# Patient Record
Sex: MALE | Race: WHITE | Employment: OTHER | URBAN - METROPOLITAN AREA
[De-identification: names, ages, dates, MRNs, and addresses within clinical notes are randomized per-mention and may not be internally consistent; named-entity substitution may affect disease eponyms.]

---

## 2017-12-07 ENCOUNTER — HOSPITAL ENCOUNTER (OUTPATIENT)
Dept: NEUROLOGY | Age: 67
Discharge: HOME | End: 2017-12-07
Attending: INTERNAL MEDICINE | Admitting: INTERNAL MEDICINE

## 2017-12-07 DIAGNOSIS — E11.9 TYPE 2 DIABETES MELLITUS WITHOUT COMPLICATIONS (HCC): ICD-10-CM

## 2021-11-21 ENCOUNTER — APPOINTMENT (OUTPATIENT)
Dept: CT IMAGING | Age: 71
End: 2021-11-21
Payer: MEDICARE

## 2021-11-21 ENCOUNTER — APPOINTMENT (OUTPATIENT)
Dept: GENERAL RADIOLOGY | Age: 71
End: 2021-11-21
Payer: MEDICARE

## 2021-11-21 ENCOUNTER — HOSPITAL ENCOUNTER (EMERGENCY)
Age: 71
Discharge: HOME | End: 2021-11-21
Attending: EMERGENCY MEDICINE
Payer: MEDICARE

## 2021-11-21 VITALS
DIASTOLIC BLOOD PRESSURE: 76 MMHG | HEART RATE: 90 BPM | SYSTOLIC BLOOD PRESSURE: 170 MMHG | TEMPERATURE: 99.5 F | RESPIRATION RATE: 16 BRPM | OXYGEN SATURATION: 96 %

## 2021-11-21 DIAGNOSIS — R55 SYNCOPE AND COLLAPSE: Primary | ICD-10-CM

## 2021-11-21 DIAGNOSIS — R51.9 HEADACHE, UNSPECIFIED HEADACHE TYPE: ICD-10-CM

## 2021-11-21 LAB
ANION GAP SERPL CALCULATED.3IONS-SCNC: 10 MMOL/L (ref 3–16)
BASOPHILS ABSOLUTE: 0 K/UL (ref 0–0.2)
BASOPHILS RELATIVE PERCENT: 0.2 %
BUN BLDV-MCNC: 17 MG/DL (ref 7–20)
CALCIUM SERPL-MCNC: 9.3 MG/DL (ref 8.3–10.6)
CHLORIDE BLD-SCNC: 102 MMOL/L (ref 99–110)
CO2: 24 MMOL/L (ref 21–32)
CREAT SERPL-MCNC: 1 MG/DL (ref 0.8–1.3)
D DIMER: 301 NG/ML DDU (ref 0–229)
EKG ATRIAL RATE: 91 BPM
EKG DIAGNOSIS: NORMAL
EKG P AXIS: 39 DEGREES
EKG P-R INTERVAL: 158 MS
EKG Q-T INTERVAL: 350 MS
EKG QRS DURATION: 112 MS
EKG QTC CALCULATION (BAZETT): 430 MS
EKG R AXIS: 23 DEGREES
EKG T AXIS: 36 DEGREES
EKG VENTRICULAR RATE: 91 BPM
EOSINOPHILS ABSOLUTE: 0 K/UL (ref 0–0.6)
EOSINOPHILS RELATIVE PERCENT: 0.1 %
GFR AFRICAN AMERICAN: >60
GFR NON-AFRICAN AMERICAN: >60
GLUCOSE BLD-MCNC: 155 MG/DL (ref 70–99)
HCT VFR BLD CALC: 40.5 % (ref 40.5–52.5)
HEMOGLOBIN: 13.6 G/DL (ref 13.5–17.5)
LYMPHOCYTES ABSOLUTE: 0.4 K/UL (ref 1–5.1)
LYMPHOCYTES RELATIVE PERCENT: 8.3 %
MCH RBC QN AUTO: 29.8 PG (ref 26–34)
MCHC RBC AUTO-ENTMCNC: 33.7 G/DL (ref 31–36)
MCV RBC AUTO: 88.4 FL (ref 80–100)
MONOCYTES ABSOLUTE: 0.6 K/UL (ref 0–1.3)
MONOCYTES RELATIVE PERCENT: 11.7 %
NEUTROPHILS ABSOLUTE: 3.9 K/UL (ref 1.7–7.7)
NEUTROPHILS RELATIVE PERCENT: 79.7 %
PDW BLD-RTO: 12.5 % (ref 12.4–15.4)
PLATELET # BLD: 146 K/UL (ref 135–450)
PMV BLD AUTO: 9.1 FL (ref 5–10.5)
POTASSIUM REFLEX MAGNESIUM: 4.6 MMOL/L (ref 3.5–5.1)
RBC # BLD: 4.59 M/UL (ref 4.2–5.9)
SODIUM BLD-SCNC: 136 MMOL/L (ref 136–145)
TROPONIN: <0.01 NG/ML
WBC # BLD: 4.8 K/UL (ref 4–11)

## 2021-11-21 PROCEDURE — 85379 FIBRIN DEGRADATION QUANT: CPT

## 2021-11-21 PROCEDURE — 70450 CT HEAD/BRAIN W/O DYE: CPT

## 2021-11-21 PROCEDURE — 80048 BASIC METABOLIC PNL TOTAL CA: CPT

## 2021-11-21 PROCEDURE — 93010 ELECTROCARDIOGRAM REPORT: CPT | Performed by: INTERNAL MEDICINE

## 2021-11-21 PROCEDURE — 84484 ASSAY OF TROPONIN QUANT: CPT

## 2021-11-21 PROCEDURE — 71045 X-RAY EXAM CHEST 1 VIEW: CPT

## 2021-11-21 PROCEDURE — 99283 EMERGENCY DEPT VISIT LOW MDM: CPT

## 2021-11-21 PROCEDURE — 93005 ELECTROCARDIOGRAM TRACING: CPT | Performed by: EMERGENCY MEDICINE

## 2021-11-21 PROCEDURE — 36415 COLL VENOUS BLD VENIPUNCTURE: CPT

## 2021-11-21 PROCEDURE — 85025 COMPLETE CBC W/AUTO DIFF WBC: CPT

## 2021-11-21 NOTE — ED PROVIDER NOTES
eMERGENCY dEPARTMENT eNCOUnter      [facility]    Chief Complaint   Patient presents with    Altered Mental Status     PT states he had LOC for about 20 min or so about 0700 this AM.  PT glucose at 0720 was 142, EMS called, glucose 148. PT wife transported PT to hospital.  Had simular episode in 2017       HPI    Zuleima Mallory is a 65 y.o. male who presents the ER for evaluation of a syncopal episode he had a preceding headache preceding his syncope, he had no prodrome with this. He was apparently stable his glucose was 124 and had complete loss of consciousness with no prodrome. No seizure activity no loss of bowel or bladder function. He has similar episode 5 years of unclear etiology. He is a GCS of 15 at this point in time. No fevers no rigors. No loss of bowel or bladder function. No prodrome. PAST MEDICAL HISTORY    Past Medical History:   Diagnosis Date    Diabetes mellitus ([facility] [address]     Hyperlipidemia        SURGICAL HISTORY    History reviewed. No pertinent surgical history. CURRENT MEDICATIONS    Current Outpatient Rx   Medication Sig Dispense Refill    METFORMIN HCL PO Take 750 mg by mouth 2 times daily      simvastatin (ZOCOR) 20 MG tablet Take 20 mg by mouth nightly         ALLERGIES    No Known Allergies    FAMILY HISTORY    History reviewed. No pertinent family history.     SOCIAL HISTORY    Social History     Socioeconomic History    Marital status:      Spouse name: None    Number of children: None    Years of education: None    Highest education level: None   Occupational History    None   Tobacco Use    Smoking status: Former Smoker    Smokeless tobacco: Never Used   Substance and Sexual Activity    Alcohol use: None    Drug use: None    Sexual activity: None   Other Topics Concern    None   Social History Narrative    None     Social Determinants of Health     Financial Resource Strain:     Difficulty of Paying Living Expenses: Not on file   Food Insecurity:  Worried About Running Out of Food in the Last Year: Not on file    Andrew of Food in the Last Year: Not on file   Transportation Needs:     Lack of Transportation (Medical): Not on file    Lack of Transportation (Non-Medical): Not on file   Physical Activity:     Days of Exercise per Week: Not on file    Minutes of Exercise per Session: Not on file   Stress:     Feeling of Stress : Not on file   Social Connections:     Frequency of Communication with Friends and Family: Not on file    Frequency of Social Gatherings with Friends and Family: Not on file    Attends Cheondoism Services: Not on file    Active Member of [address] [place] [address] or Organizations: Not on file    Attends Club or Organization Meetings: Not on file    Marital Status: Not on file   Intimate Partner Violence:     Fear of Current or Ex-Partner: Not on file    Emotionally Abused: Not on file    Physically Abused: Not on file    Sexually Abused: Not on file   Housing Stability:     Unable to Pay for Housing in the Last Year: Not on file    Number of [place] in the Last Year: Not on file    Unstable Housing in the Last Year: Not on file       REVIEW OF SYSTEMS    Constitutional:  Denies fever, chills, weight loss or weakness   Eyes:  Denies photophobia or discharge   HENT:  Denies sore throat or ear pain   Respiratory:  Denies cough or shortness of breath   Cardiovascular:  Denies chest pain, palpitations or swelling   GI:  Denies abdominal pain, nausea, vomiting, or diarrhea   Musculoskeletal:  Denies back pain   Skin:  Denies rash   Neurologic:  + headache, focal weakness or sensory changes   Endocrine:  Denies polyuria or polydypsia   Lymphatic:  Denies swollen glands   Psychiatric:  Denies depression, suicidal ideation or homicidal ideation   All systems negative except as marked.      PHYSICAL EXAM    VITAL SIGNS: BP (!) 170/76   Pulse 90   Temp 99.5 °F (37.5 °C)   Resp 16   SpO2 96%    Constitutional:  Well developed, Well nourished, No acute distress, Non-toxic appearance. HENT:  Normocephalic, Atraumatic, Bilateral external ears normal, Oropharynx moist, No oral exudates, Nose normal. Neck- Normal range of motion, No tenderness, Supple, No stridor. Eyes:  PERRL, EOMI, Conjunctiva normal, No discharge. Respiratory:  Normal breath sounds, No respiratory distress, No wheezing, No chest tenderness. Cardiovascular:  Normal heart rate, Normal rhythm, No murmurs, No rubs, No gallops. GI:  Bowel sounds normal, Soft, No tenderness, No masses, No pulsatile masses. Musculoskeletal:  Intact distal pulses, No edema, No tenderness, No cyanosis, No clubbing. Good range of motion in all major joints. No tenderness to palpation or major deformities noted. Back- No tenderness. Integument:  Warm, Dry, No erythema, No rash. Lymphatic:  No lymphadenopathy noted. Neurologic:  Alert & oriented x 3, Normal motor function, Normal sensory function, No focal deficits noted. Psychiatric:  Affect normal, Judgment normal, Mood normal.     EKG    Sinus rhythm, normal axis, right bundle branch block normal R wave progression no acute ST segment abnormalities    RADIOLOGY    Ct head: no ich/no cva    PROCEDURES    None    ED COURSE & MEDICAL DECISION MAKING    Pertinent Labs & Imaging studies reviewed. (See chart for details)  Patient presents to the ER for evaluation of headache resolved with associated syncope. He has no evidence of subarachnoid hemorrhage, has had similar episode in the past with vasovagal syncope. His [place] syncope score is unremarkable. He has had no dysrhythmias, no troponin leak no evidence of intracranial hemorrhage. Is a GCS of 15 with no focal neurologic deficits. He had resolved headache with no active headache currently. Outpatient follow-up with treating physician and cardiology return if worse or new symptoms    FINAL IMPRESSION    1. Syncope and collapse    2.  Headache, unspecified headache type Renato Ha MD  07/95/13 9319

## 2021-11-21 NOTE — ED NOTES
Bed: 23  Expected date:   Expected time:   Means of arrival:   Comments:  Angel Lara RN  94/40/37 [id number]

## 2021-11-22 NOTE — PROGRESS NOTES
Date Taking? Authorizing Provider   aspirin 81 MG EC tablet Take 81 mg by mouth daily   Yes Historical Provider, MD   calcium carbonate (OYSTER SHELL CALCIUM 500 MG) 1250 (500 Ca) MG tablet Take 1,200 mg by mouth daily   Yes Historical Provider, MD   Coenzyme Q10 50 MG CAPS Take 100 mg by mouth daily   Yes Historical Provider, MD   Cinnamon 500 MG CAPS Take by mouth daily   Yes Historical Provider, MD   cyanocobalamin 1000 MCG tablet Take 1,000 mcg by mouth daily   Yes Historical Provider, MD   Magnesium 500 MG CAPS Take by mouth   Yes Historical Provider, MD   Multiple Vitamins-Minerals (PRESERVISION AREDS 2 PO) Take by mouth   Yes Historical Provider, MD   METFORMIN HCL PO Take 750 mg by mouth 2 times daily   Yes Historical Provider, MD   simvastatin (ZOCOR) 20 MG tablet Take 20 mg by mouth nightly   Yes Historical Provider, MD        Allergies:  Patient has no known allergies. Family history has been reviewed and not pertinent except as noted above. Review of Systems:   · Constitutional: there has been no unanticipated weight loss. No change in energy or activity level   · Eyes: No visual changes   · ENT: No Headaches, hearing loss or vertigo. No mouth sores or sore throat. · Cardiovascular: Reviewed in HPI  · Respiratory: No cough or wheezing, no sputum production. · Gastrointestinal: No abdominal pain, appetite loss, blood in stools. No change in bowel or bladder habits. · Genitourinary: No nocturia, dysuria, trouble voiding  · Musculoskeletal:  No gait disturbance, weakness or joint complaints. · Integumentary: No rash or pruritis. · Neurological: No headache, change in muscle strength, numbness or tingling. No postictal state with his syncope epsodes. No change in gait, balance, coordination, mood, affect, memory, mentation, behavior.   · Psychiatric: No anxiety or depression  · Endocrine: No malaise or fever  · Hematologic/Lymphatic: No abnormal bruising or bleeding, blood clots or swollen lymph nodes. · Allergic/Immunologic: No nasal congestion or hives. Physical Examination:    Vitals:    12/08/21 1524   BP: (!) 146/78   Pulse: 102   SpO2: 96%        Wt Readings from Last 1 Encounters:   12/08/21 167 lb (75.8 kg)        · CONSTITUTIONAL: Well developed, well nourished  · EYES: PERRLA. No xanthelasma, sclera non icteric  · EARS,NOSE,MOUTH,THROAT:  Mucous membranes moist, normal hearing  · NECK: Supple, JVP normal, thyroid not enlarged. Carotids 2+ without bruits  · RESPIRATORY: Normal effort, no rales or rhonchi  · CARDIOVASCULAR: Normal PMI, regular rate and rhythm, no murmurs, rub or gallop. No edema. Radial pulses present and equal  · CHEST: No scar or masses  · ABDOMEN: Normal bowel sounds. No masses or tenderness. No bruit  · MUSCULOSKELETAL: No clubbing or cyanosis. Moves all extremities well. Normal gait  · SKIN:  Warm and dry. No rashes  · NEUROLOGIC: Cranial nerves intact. Alert and oriented  · PSYCHIATRIC: Calm affect. Appears to have normal judgement and insight     All testing and labs listed below were personally reviewed by myself. Assessment:    Syncope, recent  -Lack of prodrome is very concerning for cardiogenic syncope  -DDx includes absence seizure, but those are rare outside of childhood  -Seen in ER 11/21/21, preceded by headache. No prodromal symptoms, was sitting in a chair at the time while wife cut a hangnail. -EKG 11/21/21 (in the [facility] ER)> NSR 91, IRBBB  -One other episode in 12/2017.  -Holter monitor 12/2017> NSR/SA, ahr 79 (ranges ), PSVT, 7 PVC's  -Carotid dopplers 2018> Less than 50% bilaterally  -Plain GXT 2018> Interpetation Summary:    1. Negative ECG for ischemia with graded exercise test.    2. Duke Treadmill Score is 10 which indicates low risk.      Plan: MCOT x 30 days, echocardiogram to r/o aortic stenosis or LV dysfunction, carotid Dopplers    Hyperlipidemia   -Taking simvastatin 20mg  -Managed per PCP    Diabetes mellitus  -Managed by PCP        PLAN:  1. MCOT x 30 days. Consider EP evaluation with possible ILR implantation. 2. ECHO soon  3. Carotid dopplers soon  4. Follow up in 6 weeks    I appreciate the opportunity of cooperating in the care of this individual.    Quan West M.D. Tonoibe's attestation: This note was scribed in the presence of Dr. Kwasi Saldaña MD, by Edelmira Seo RN. Physician Attestation  The scribe for and in the presence of me Quan West MD). The scribe may have prepopulated components of this note with my historical  intellectual property under my direct supervision. Any additions to this intellectual property were performed in my presence and at my direction. Furthermore, the content and accuracy of this note have been reviewed by me. Cassidy Gamez MD 12/8/2021 4:29 PM

## 2021-12-08 ENCOUNTER — OFFICE VISIT (OUTPATIENT)
Dept: CARDIOLOGY CLINIC | Age: 71
End: 2021-12-08
Payer: MEDICARE

## 2021-12-08 VITALS
OXYGEN SATURATION: 96 % | DIASTOLIC BLOOD PRESSURE: 78 MMHG | BODY MASS INDEX: 24.73 KG/M2 | HEIGHT: 69 IN | HEART RATE: 102 BPM | WEIGHT: 167 LBS | SYSTOLIC BLOOD PRESSURE: 146 MMHG

## 2021-12-08 DIAGNOSIS — R55 SYNCOPE, UNSPECIFIED SYNCOPE TYPE: Primary | ICD-10-CM

## 2021-12-08 DIAGNOSIS — I65.23 BILATERAL CAROTID ARTERY STENOSIS: ICD-10-CM

## 2021-12-08 DIAGNOSIS — E78.49 OTHER HYPERLIPIDEMIA: ICD-10-CM

## 2021-12-08 PROCEDURE — 99204 OFFICE O/P NEW MOD 45 MIN: CPT | Performed by: INTERNAL MEDICINE

## 2021-12-08 PROCEDURE — G8420 CALC BMI NORM PARAMETERS: HCPCS | Performed by: INTERNAL MEDICINE

## 2021-12-08 PROCEDURE — 3017F COLORECTAL CA SCREEN DOC REV: CPT | Performed by: INTERNAL MEDICINE

## 2021-12-08 PROCEDURE — 93228 REMOTE 30 DAY ECG REV/REPORT: CPT | Performed by: INTERNAL MEDICINE

## 2021-12-08 PROCEDURE — G8427 DOCREV CUR MEDS BY ELIG CLIN: HCPCS | Performed by: INTERNAL MEDICINE

## 2021-12-08 PROCEDURE — G8484 FLU IMMUNIZE NO ADMIN: HCPCS | Performed by: INTERNAL MEDICINE

## 2021-12-08 PROCEDURE — 1036F TOBACCO NON-USER: CPT | Performed by: INTERNAL MEDICINE

## 2021-12-08 PROCEDURE — 4040F PNEUMOC VAC/ADMIN/RCVD: CPT | Performed by: INTERNAL MEDICINE

## 2021-12-08 PROCEDURE — 1123F ACP DISCUSS/DSCN MKR DOCD: CPT | Performed by: INTERNAL MEDICINE

## 2021-12-08 RX ORDER — AMPICILLIN TRIHYDRATE 250 MG
CAPSULE ORAL DAILY
COMMUNITY

## 2021-12-08 RX ORDER — ASPIRIN 81 MG/1
81 TABLET ORAL DAILY
COMMUNITY

## 2021-12-08 RX ORDER — IBUPROFEN 200 MG
1200 CAPSULE ORAL DAILY
COMMUNITY

## 2021-12-08 RX ORDER — UBIDECARENONE 50 MG
100 CAPSULE ORAL DAILY
COMMUNITY

## 2021-12-08 RX ORDER — FOLIC ACID 0.8 MG
TABLET ORAL
COMMUNITY

## 2021-12-10 ENCOUNTER — HOSPITAL ENCOUNTER (OUTPATIENT)
Dept: CARDIOLOGY | Age: 71
Discharge: HOME | End: 2021-12-10
Payer: MEDICARE

## 2021-12-10 DIAGNOSIS — R55 SYNCOPE, UNSPECIFIED SYNCOPE TYPE: ICD-10-CM

## 2021-12-10 DIAGNOSIS — I65.23 BILATERAL CAROTID ARTERY STENOSIS: ICD-10-CM

## 2021-12-10 DIAGNOSIS — E78.49 OTHER HYPERLIPIDEMIA: ICD-10-CM

## 2021-12-10 LAB
LV EF: 63 %
LVEF MODALITY: NORMAL

## 2021-12-10 PROCEDURE — 93880 EXTRACRANIAL BILAT STUDY: CPT

## 2021-12-10 PROCEDURE — 93306 TTE W/DOPPLER COMPLETE: CPT

## 2022-01-10 NOTE — PROGRESS NOTES
[address]   Cardiac Consultation    Referring Provider:  Sánchez Horowitz MD     Chief Complaint   Patient presents with    Follow-up    Loss of Consciousness      History of Present Illness:  Mr Zan Galdamez is seen today in follow up to go over results of studies we ordered at last visit to investigate a possible cardiac syncopal episode. He had a single syncopal episode on 11/21/21 as detailed in prior notes. He had a remote syncopal episode 4 years ago. He feels great today without any recurrent syncope or chest pain. Today Pt denies any lightheadedness or dizziness. Denies fainting since his last visit. No chest pain, SOB or swelling. Past Medical History:   has a past medical history of Diabetes mellitus ([address] and Hyperlipidemia. Surgical History:   has a past surgical history that includes knee surgery (Right, 1999); Inguinal hernia repair (1959); and Cataract removal with implant (Left, 2020). Social History:   reports that he has quit smoking. He has never used smokeless tobacco. He reports current alcohol use of about 1.0 standard drink of alcohol per week. Family History:  family history includes Hypertension in his father and mother. Home Medications:  Prior to Admission medications    Medication Sig Start Date End Date Taking?  Authorizing Provider   aspirin 81 MG EC tablet Take 81 mg by mouth daily   Yes Historical Provider, MD   calcium carbonate (OYSTER SHELL CALCIUM 500 MG) 1250 (500 Ca) MG tablet Take 1,200 mg by mouth daily   Yes Historical Provider, MD   Coenzyme Q10 50 MG CAPS Take 100 mg by mouth daily   Yes Historical Provider, MD   Cinnamon 500 MG CAPS Take by mouth daily   Yes Historical Provider, MD   cyanocobalamin 1000 MCG tablet Take 1,000 mcg by mouth daily   Yes Historical Provider, MD   Magnesium 500 MG CAPS Take by mouth   Yes Historical Provider, MD   Multiple Vitamins-Minerals (PRESERVISION AREDS 2 PO) Take by mouth   Yes Historical Provider, MD METFORMIN HCL PO Take 500 mg by mouth 3 times daily    Yes Historical Provider, MD   simvastatin (ZOCOR) 20 MG tablet Take 20 mg by mouth nightly   Yes Historical Provider, MD        Allergies:  Patient has no known allergies. Family history has been reviewed and not pertinent except as noted above. Physical Examination:    Vitals:    01/14/22 1411   BP: (!) 146/82   Pulse: 84   SpO2: 98%        Wt Readings from Last 1 Encounters:   01/14/22 172 lb 6.4 oz (78.2 kg)        · CONSTITUTIONAL: Well developed, well nourished  · EYES: PERRLA. No xanthelasma, sclera non icteric  · EARS,NOSE,MOUTH,THROAT:  Mucous membranes moist, normal hearing  · NECK: Supple, JVP normal, thyroid not enlarged. Carotids 2+ without bruits  · RESPIRATORY: Normal effort, no rales or rhonchi  · CARDIOVASCULAR: Normal PMI, regular rate and rhythm, no murmurs, rub or gallop. No edema. Radial pulses present and equal  · CHEST: No scar or masses  · ABDOMEN: Normal bowel sounds. No masses or tenderness. No bruit  · MUSCULOSKELETAL: No clubbing or cyanosis. Moves all extremities well. Normal gait  · SKIN:  Warm and dry. No rashes  · NEUROLOGIC: Cranial nerves intact. Alert and oriented  · PSYCHIATRIC: Calm affect. Appears to have normal judgement and insight   All testing and labs listed below were personally reviewed by myself    Echo: 12/10/2021   . Left Ventricle  Normal left ventricle size, wall thickness and systolic function with an estimated ejection fraction of 60-65%. No regional wall motion  abnormalities are seen. Normal diastolic function. E/e\"=10.45. Mitral Valve  The mitral valve normal in structure and function. No evidence of mitral regurgitation or stenosis. Mitral annular calcification is present. Carotid Duplex Study: 12/10/2021  Right Impression//  The right internal carotid artery appears to have a <50% diameter reducing stenosis based on velocity criteria.   The right distal common carotid artery demonstrates mild focal plaque formation. The right vertebral artery demonstrates normal antegrade flow. The right subclavian artery is visualized and demonstrates biphasic flow. Left Impression  The left internal carotid artery appears to have a <50% diameter reducing stenosis based on velocity criteria. The left distal common carotid artery demonstrates mild focal plaque formation. The left vertebral artery demonstrates normal antegrade flow. The left subclavian artery is visualized and demonstrates biphasic flow. Holter Monitor 30 Days: 12/8/2021   NSR with PVC's & PAC's: Pt monitored 28 days/ 10 hours/ 37 minutes   HR- Highest 144/ Lowest 57/ Average 84          Assessment: 1. Syncope, recent  -Lack of prodrome is very concerning for cardiogenic syncope  -However all studies (echo, cartoid Doppler, and event monitor) do not show significant disease  -He has mild carotid plaque  -Reassured him no further workup needed unless new symptoms    2. Hyperlipidemia   -Taking simvastatin 20mg  -Managed per PCP    3. Diabetes mellitus  -Managed by PCP    Follow up: 1 year    I appreciate the opportunity of cooperating in the care of this individual.    Martir Sanches M.D. Tonoibe's Attestation: This note was scribed in the presence of Dr. Jeffrey Bridges MD by Roz Casper RN. 01/14/22    Physician Attestation  The scribe wrote this note in the presence of me Martir Sanches MD). The scribe may have prepopulated components of this note with my historical  intellectual property under my direct supervision. Any additions to this intellectual property were performed in my presence and at my direction. Furthermore, the content and accuracy of this note have been reviewed by me with edits by me as needed.   Martir Sanches MD 1/14/2022 2:35 PM

## 2022-01-10 NOTE — PROGRESS NOTES
[facility]   Cardiac Consultation    Referring Provider:  Marty Anand MD     No chief complaint on file. History of Present Illness:  Adolfo Almonte is seen today at the request of ER physician for cardiac evaluation for sycope. He was seen in the [facility] ER 11/21/21 with a syncopal episode following a headache. Glucose was 124. No apparent seizure activity, no loss of bowel/bladder. Testing negative, headache resolved, and he was discharged home. He had reported one other episode 12/2017 with unknown etiology (he did trhow up with this episode); HM at the time was negative for concerning arrhythmias. He did not see a cardiologist at the time. His other medical history includes hyperlipidemia and Diabetes. Former smoker. No family history of early heart disease. Today, he reports that on 11/21/21, his wife was assisting him with cutting a hangnail while sitting when he suddenly became unresponsive; she held him in the chair. He was \"staring off into space. \" By the time she got 911 on the phone, he was coming around. He had no prodromal symptoms; they had just finished eating dinner. He did not throw up this time. No symptoms since then. He denies exertional chest pain, BUSH/PND, palpitations, light-headedness, edema. His wife is with him for the visit. Past Medical History:   has a past medical history of Diabetes mellitus ([address] and Hyperlipidemia. Surgical History:   has a past surgical history that includes knee surgery (Right, 1999); Inguinal hernia repair (1959); and Cataract removal with implant (Left, 2020). Social History:   reports that he has quit smoking. He has never used smokeless tobacco. He reports current alcohol use of about 1.0 standard drink of alcohol per week. Family History:  family history includes Hypertension in his father and mother. Home Medications:  Prior to Admission medications    Medication Sig Start Date End Date Taking?  Authorizing Provider   aspirin 81 MG EC tablet Take 81 mg by mouth daily    Historical Provider, MD   calcium carbonate (OYSTER SHELL CALCIUM 500 MG) 1250 (500 Ca) MG tablet Take 1,200 mg by mouth daily    Historical Provider, MD   Coenzyme Q10 50 MG CAPS Take 100 mg by mouth daily    Historical Provider, MD   Cinnamon 500 MG CAPS Take by mouth daily    Historical Provider, MD   cyanocobalamin 1000 MCG tablet Take 1,000 mcg by mouth daily    Historical Provider, MD   Magnesium 500 MG CAPS Take by mouth    Historical Provider, MD   Multiple Vitamins-Minerals (PRESERVISION AREDS 2 PO) Take by mouth    Historical Provider, MD   METFORMIN HCL PO Take 750 mg by mouth 2 times daily    Historical Provider, MD   simvastatin (ZOCOR) 20 MG tablet Take 20 mg by mouth nightly    Historical Provider, MD        Allergies:  Patient has no known allergies. Family history has been reviewed and not pertinent except as noted above. Review of Systems:   · Constitutional: there has been no unanticipated weight loss. No change in energy or activity level   · Eyes: No visual changes   · ENT: No Headaches, hearing loss or vertigo. No mouth sores or sore throat. · Cardiovascular: Reviewed in HPI  · Respiratory: No cough or wheezing, no sputum production. · Gastrointestinal: No abdominal pain, appetite loss, blood in stools. No change in bowel or bladder habits. · Genitourinary: No nocturia, dysuria, trouble voiding  · Musculoskeletal:  No gait disturbance, weakness or joint complaints. · Integumentary: No rash or pruritis. · Neurological: No headache, change in muscle strength, numbness or tingling. No postictal state with his syncope epsodes. No change in gait, balance, coordination, mood, affect, memory, mentation, behavior. · Psychiatric: No anxiety or depression  · Endocrine: No malaise or fever  · Hematologic/Lymphatic: No abnormal bruising or bleeding, blood clots or swollen lymph nodes.   · Allergic/Immunologic: No nasal congestion or hives.    Physical Examination:    There were no vitals filed for this visit. Wt Readings from Last 1 Encounters:   12/08/21 167 lb (75.8 kg)        · CONSTITUTIONAL: Well developed, well nourished  · EYES: PERRLA. No xanthelasma, sclera non icteric  · EARS,NOSE,MOUTH,THROAT:  Mucous membranes moist, normal hearing  · NECK: Supple, JVP normal, thyroid not enlarged. Carotids 2+ without bruits  · RESPIRATORY: Normal effort, no rales or rhonchi  · CARDIOVASCULAR: Normal PMI, regular rate and rhythm, no murmurs, rub or gallop. No edema. Radial pulses present and equal  · CHEST: No scar or masses  · ABDOMEN: Normal bowel sounds. No masses or tenderness. No bruit  · MUSCULOSKELETAL: No clubbing or cyanosis. Moves all extremities well. Normal gait  · SKIN:  Warm and dry. No rashes  · NEUROLOGIC: Cranial nerves intact. Alert and oriented  · PSYCHIATRIC: Calm affect. Appears to have normal judgement and insight   All testing and labs listed below were personally reviewed by myself    Echo: 12/10/2021   . Left Ventricle  Normal left ventricle size, wall thickness and systolic function with an estimated ejection fraction of 60-65%. No regional wall motion  abnormalities are seen. Normal diastolic function. E/e\"=10.45. Mitral Valve  The mitral valve normal in structure and function. No evidence of mitral regurgitation or stenosis. Mitral annular calcification is present. Carotid Duplex Study: 12/10/2021  Right Impression//  The right internal carotid artery appears to have a <50% diameter reducing stenosis based on velocity criteria. The right distal common carotid artery demonstrates mild focal plaque formation. The right vertebral artery demonstrates normal antegrade flow. The right subclavian artery is visualized and demonstrates biphasic flow. Left Impression  The left internal carotid artery appears to have a <50% diameter reducing stenosis based on velocity criteria.   The left distal common carotid artery demonstrates mild focal plaque formation. The left vertebral artery demonstrates normal antegrade flow. The left subclavian artery is visualized and demonstrates biphasic flow. Holter Monitor 30 Days: 12/8/2021           Assessment:    Syncope, recent  -Lack of prodrome is very concerning for cardiogenic syncope  -DDx includes absence seizure, but those are rare outside of childhood  -Seen in ER 11/21/21, preceded by headache. No prodromal symptoms, was sitting in a chair at the time while wife cut a hangnail. -EKG 11/21/21 (in the [facility] ER)> NSR 91, IRBBB  -One other episode in 12/2017.  -Holter monitor 12/2017> NSR/SA, ahr 79 (ranges ), PSVT, 7 PVC's  -Carotid dopplers 2018> Less than 50% bilaterally  -Plain GXT 2018> Interpetation Summary:    1. Negative ECG for ischemia with graded exercise test.    2. Duke Treadmill Score is 10 which indicates low risk. Hyperlipidemia   -Taking simvastatin 20mg  -Managed per PCP    Diabetes mellitus  -Managed by PCP        PLAN:  1.   2.  3.   4.     I appreciate the opportunity of cooperating in the care of this individual.    Jazmine Parada M.D. Scribe's attestation: This note was scribed in the presence of Dr. Joyce Adler MD, by Ofe Allen RN. Physician Attestation  The scribe for and in the presence of me (Josh Castillo RN). The scribe may have prepopulated components of this note with my historical  intellectual property under my direct supervision. Any additions to this intellectual property were performed in my presence and at my direction. Furthermore, the content and accuracy of this note have been reviewed by me. Gee Settler   Josh Castillo RN 1/10/2022 10:26 AM

## 2022-01-14 ENCOUNTER — OFFICE VISIT (OUTPATIENT)
Dept: CARDIOLOGY CLINIC | Age: 72
End: 2022-01-14
Payer: MEDICARE

## 2022-01-14 VITALS
HEART RATE: 84 BPM | HEIGHT: 69 IN | OXYGEN SATURATION: 98 % | WEIGHT: 172.4 LBS | BODY MASS INDEX: 25.53 KG/M2 | SYSTOLIC BLOOD PRESSURE: 146 MMHG | DIASTOLIC BLOOD PRESSURE: 82 MMHG

## 2022-01-14 DIAGNOSIS — E78.5 HYPERLIPIDEMIA, UNSPECIFIED HYPERLIPIDEMIA TYPE: ICD-10-CM

## 2022-01-14 DIAGNOSIS — R55 SYNCOPE, UNSPECIFIED SYNCOPE TYPE: Primary | ICD-10-CM

## 2022-01-14 PROCEDURE — 4040F PNEUMOC VAC/ADMIN/RCVD: CPT | Performed by: INTERNAL MEDICINE

## 2022-01-14 PROCEDURE — 1123F ACP DISCUSS/DSCN MKR DOCD: CPT | Performed by: INTERNAL MEDICINE

## 2022-01-14 PROCEDURE — 3017F COLORECTAL CA SCREEN DOC REV: CPT | Performed by: INTERNAL MEDICINE

## 2022-01-14 PROCEDURE — G8484 FLU IMMUNIZE NO ADMIN: HCPCS | Performed by: INTERNAL MEDICINE

## 2022-01-14 PROCEDURE — G8417 CALC BMI ABV UP PARAM F/U: HCPCS | Performed by: INTERNAL MEDICINE

## 2022-01-14 PROCEDURE — 99212 OFFICE O/P EST SF 10 MIN: CPT | Performed by: INTERNAL MEDICINE

## 2022-01-14 PROCEDURE — G8427 DOCREV CUR MEDS BY ELIG CLIN: HCPCS | Performed by: INTERNAL MEDICINE

## 2022-01-14 PROCEDURE — 1036F TOBACCO NON-USER: CPT | Performed by: INTERNAL MEDICINE

## 2023-01-05 NOTE — PROGRESS NOTES
[facility]   Cardiac Consultation    Referring Provider:  Murphy Rey MD     CC: Syncope, RBBB, HLP     History of Present Illness:  Mr Renny Dorsey is seen today for 1yr follow up. He has had previous studies to investigate a possible cardiac syncopal episode. He had a single syncopal episode on 11/21/21 as detailed in prior notes. He had a remote syncopal episode 4 years ago. At last visit he reported that he feels great without any recurrent syncope or chest pain. Today he has no new complaints. Past Medical History:   has a past medical history of Diabetes mellitus ([address] and Hyperlipidemia. Surgical History:   has a past surgical history that includes knee surgery (Right, 1999); Inguinal hernia repair (1959); and Cataract removal with implant (Left, 2020). Social History:   reports that he has quit smoking. He has never used smokeless tobacco. He reports current alcohol use of about 1.0 standard drink per week. Family History:  family history includes Hypertension in his father and mother. Home Medications:  Prior to Admission medications    Medication Sig Start Date End Date Taking?  Authorizing Provider   aspirin 81 MG EC tablet Take 81 mg by mouth daily    Historical Provider, MD   calcium carbonate (OYSTER SHELL CALCIUM 500 MG) 1250 (500 Ca) MG tablet Take 1,200 mg by mouth daily    Historical Provider, MD   Coenzyme Q10 50 MG CAPS Take 100 mg by mouth daily    Historical Provider, MD   Cinnamon 500 MG CAPS Take by mouth daily    Historical Provider, MD   cyanocobalamin 1000 MCG tablet Take 1,000 mcg by mouth daily    Historical Provider, MD   Magnesium 500 MG CAPS Take by mouth    Historical Provider, MD   Multiple Vitamins-Minerals (PRESERVISION AREDS 2 PO) Take by mouth    Historical Provider, MD   METFORMIN HCL PO Take 500 mg by mouth 3 times daily     Historical Provider, MD   simvastatin (ZOCOR) 20 MG tablet Take 20 mg by mouth nightly    Historical Provider, MD Allergies:  Patient has no known allergies. Family history has been reviewed and not pertinent except as noted above. Physical Examination:    There were no vitals filed for this visit. Wt Readings from Last 1 Encounters:   01/14/22 172 lb 6.4 oz (78.2 kg)        CONSTITUTIONAL: Well developed, well nourished  EYES: PERRLA. No xanthelasma, sclera non icteric  EARS,NOSE,MOUTH,THROAT:  Mucous membranes moist, normal hearing  NECK: Supple, JVP normal, thyroid not enlarged. Carotids 2+ without bruits  RESPIRATORY: Normal effort, no rales or rhonchi  CARDIOVASCULAR: Normal PMI, regular rate and rhythm, no murmurs, rub or gallop. No edema. Radial pulses present and equal  CHEST: No scar or masses  ABDOMEN: Normal bowel sounds. No masses or tenderness. No bruit  MUSCULOSKELETAL: No clubbing or cyanosis. Moves all extremities well. Normal gait  SKIN:  Warm and dry. No rashes  NEUROLOGIC: Cranial nerves intact. Alert and oriented  PSYCHIATRIC: Calm affect. Appears to have normal judgement and insight   All testing and labs listed below were personally reviewed by myself    EKG 11/2021   Normal sinus rhythmIncomplete right bundle branch block    Echo: 12/10/2021   . Left Ventricle  Normal left ventricle size, wall thickness and systolic function with an estimated ejection fraction of 60-65%. No regional wall motion  abnormalities are seen. Normal diastolic function. E/e\"=10.45. Mitral Valve  The mitral valve normal in structure and function. No evidence of mitral regurgitation or stenosis. Mitral annular calcification is present. Carotid Duplex Study: 12/10/2021  Right Impression//  The right internal carotid artery appears to have a <50% diameter reducing stenosis based on velocity criteria. The right distal common carotid artery demonstrates mild focal plaque formation. The right vertebral artery demonstrates normal antegrade flow.   The right subclavian artery is visualized and demonstrates biphasic flow. Left Impression  The left internal carotid artery appears to have a <50% diameter reducing stenosis based on velocity criteria. The left distal common carotid artery demonstrates mild focal plaque formation. The left vertebral artery demonstrates normal antegrade flow. The left subclavian artery is visualized and demonstrates biphasic flow. Holter Monitor 30 Days: 12/8/2021   NSR with PVC's & PAC's: Pt monitored 28 days/ 10 hours/ 37 minutes   HR- Highest 144/ Lowest 57/ Average 84    Assessment: 1. Syncope, sporadic episodes  - Lack of prodrome is very concerning for cardiogenic syncope  - However all studies (echo, cartoid Doppler, and event monitor) do not show significant disease  - He has mild carotid plaque  - Reassured him no further workup needed unless new symptoms    2. Hyperlipidemia, pure hypercholesterolemia  - 9/2022 LDL 75 (CE)  completed every 6 months by PCP   - Taking simvastatin 20mg  - Lipids at goal  - Managed per PCP    3. Diabetes mellitus  - 9/2022 A1c 7.2  - Managed by PCP    4.  RBBB  - pt reassured that this is not necessarily a sign of heart disease, especially given normal echo last year and no murmur or symptoms today    Follow up: 1 year, sooner if needed    I appreciate the opportunity of cooperating in the care of this individual.    Esther Richter M.D.

## 2023-01-17 ENCOUNTER — OFFICE VISIT (OUTPATIENT)
Dept: CARDIOLOGY CLINIC | Age: 73
End: 2023-01-17

## 2023-01-17 VITALS
HEIGHT: 69 IN | BODY MASS INDEX: 26.04 KG/M2 | OXYGEN SATURATION: 96 % | DIASTOLIC BLOOD PRESSURE: 70 MMHG | HEART RATE: 93 BPM | SYSTOLIC BLOOD PRESSURE: 144 MMHG | WEIGHT: 175.8 LBS

## 2023-01-17 DIAGNOSIS — I45.10 RBBB: ICD-10-CM

## 2023-01-17 DIAGNOSIS — R55 SYNCOPE, UNSPECIFIED SYNCOPE TYPE: Primary | ICD-10-CM

## 2023-01-17 DIAGNOSIS — E78.5 HYPERLIPIDEMIA, UNSPECIFIED HYPERLIPIDEMIA TYPE: ICD-10-CM

## 2023-01-17 DIAGNOSIS — E78.00 PURE HYPERCHOLESTEROLEMIA: ICD-10-CM

## 2023-01-17 NOTE — PATIENT INSTRUCTIONS
Continue current medications.     F/u 1 year with an EKG, sooner if new episodes of fainting or other concerns related to your heart

## 2024-01-17 NOTE — PROGRESS NOTES
Take by mouth   Yes Aury Horton MD   METFORMIN HCL PO Take 500 mg by mouth 3 times daily    Yes Aury Horton MD   simvastatin (ZOCOR) 20 MG tablet Take 1 tablet by mouth nightly   Yes ProviderAury MD     PHYSICAL EXAM        Vitals:    01/24/24 1135   BP: 137/79   Pulse:    SpO2:     Weight - Scale: 76.4 kg (168 lb 6.4 oz)     Gen Alert, cooperative, no distress Heart  Regular rate and rhythm, no murmur.  Normal S1 and S2.  Warm/well perfused.  No JVD.  No carotid bruit.   HEENT Normocephalic, atraumatic.  Conj/corn clear  Lips, mucosa, tongue normal Abd  Soft, nontender, nondistended, no organomegaly   Neck Supple, trachea midline Ext  Ext nl, AT, no C/C, no edema   Lungs Lungs clear to auscultation bilaterally, unlabored breathing Pulse 2+ and symmetric   Chest wall No deformity Skin Color/text/turg nl, no rash/lesions   Neuro No gross deficits Psych Nl mood and affect     LABS and Imaging     Relevant and available CV data reviewed    EKG personally interpreted: 1/24/24: NSR RBBB    Lab Results   Component Value Date    PROBNP 32 12/01/2017     Lab Results   Component Value Date    WBC 4.8 11/21/2021    HGB 13.6 11/21/2021    HCT 40.5 11/21/2021    MCV 88.4 11/21/2021     11/21/2021     Lab Results   Component Value Date     11/21/2021    K 4.6 11/21/2021     11/21/2021    CO2 24 11/21/2021    BUN 17 11/21/2021    CREATININE 1.0 11/21/2021    GLUCOSE 155 (H) 11/21/2021    CALCIUM 9.3 11/21/2021    PROT 7.0 12/01/2017    LABALBU 4.3 12/01/2017    BILITOT 0.5 12/01/2017    ALKPHOS 51 12/01/2017    AST 19 12/01/2017    ALT 18 12/01/2017    LABGLOM >60 11/21/2021    GFRAA >60 11/21/2021    AGRATIO 1.6 12/01/2017    GLOB 2.7 12/01/2017     CARDIAC IMAGING/TESTING:  Echo:   Results for orders placed or performed during the hospital encounter of 12/10/21   Echo 2D w doppler w color complete   Result Value Ref Range    Left Ventricular Ejection Fraction 63     LVEF MODALITY

## 2024-01-23 PROBLEM — I45.10 RBBB: Status: ACTIVE | Noted: 2024-01-23

## 2024-01-24 ENCOUNTER — OFFICE VISIT (OUTPATIENT)
Dept: CARDIOLOGY CLINIC | Age: 74
End: 2024-01-24
Payer: MEDICARE

## 2024-01-24 VITALS
HEIGHT: 69 IN | SYSTOLIC BLOOD PRESSURE: 137 MMHG | BODY MASS INDEX: 24.94 KG/M2 | WEIGHT: 168.4 LBS | OXYGEN SATURATION: 97 % | HEART RATE: 92 BPM | DIASTOLIC BLOOD PRESSURE: 79 MMHG

## 2024-01-24 DIAGNOSIS — R55 SYNCOPE, UNSPECIFIED SYNCOPE TYPE: Primary | ICD-10-CM

## 2024-01-24 DIAGNOSIS — E78.5 HYPERLIPIDEMIA, UNSPECIFIED HYPERLIPIDEMIA TYPE: ICD-10-CM

## 2024-01-24 DIAGNOSIS — E78.00 PURE HYPERCHOLESTEROLEMIA: ICD-10-CM

## 2024-01-24 DIAGNOSIS — I45.10 RBBB: ICD-10-CM

## 2024-01-24 PROCEDURE — G8427 DOCREV CUR MEDS BY ELIG CLIN: HCPCS | Performed by: INTERNAL MEDICINE

## 2024-01-24 PROCEDURE — 99212 OFFICE O/P EST SF 10 MIN: CPT | Performed by: INTERNAL MEDICINE

## 2024-01-24 PROCEDURE — 3017F COLORECTAL CA SCREEN DOC REV: CPT | Performed by: INTERNAL MEDICINE

## 2024-01-24 PROCEDURE — G8420 CALC BMI NORM PARAMETERS: HCPCS | Performed by: INTERNAL MEDICINE

## 2024-01-24 PROCEDURE — G8484 FLU IMMUNIZE NO ADMIN: HCPCS | Performed by: INTERNAL MEDICINE

## 2024-01-24 PROCEDURE — 93000 ELECTROCARDIOGRAM COMPLETE: CPT | Performed by: INTERNAL MEDICINE

## 2024-01-24 PROCEDURE — 1123F ACP DISCUSS/DSCN MKR DOCD: CPT | Performed by: INTERNAL MEDICINE

## 2024-01-24 PROCEDURE — 1036F TOBACCO NON-USER: CPT | Performed by: INTERNAL MEDICINE

## 2024-01-24 NOTE — PATIENT INSTRUCTIONS
No need for further follow-up with cardiology unless new symptoms or other concerns.    Call use back if anything new comes up